# Patient Record
Sex: FEMALE | Race: WHITE | NOT HISPANIC OR LATINO | URBAN - METROPOLITAN AREA
[De-identification: names, ages, dates, MRNs, and addresses within clinical notes are randomized per-mention and may not be internally consistent; named-entity substitution may affect disease eponyms.]

---

## 2019-05-08 PROBLEM — Z00.00 ENCOUNTER FOR PREVENTIVE HEALTH EXAMINATION: Status: ACTIVE | Noted: 2019-05-08

## 2019-05-25 ENCOUNTER — EMERGENCY (EMERGENCY)
Facility: HOSPITAL | Age: 29
LOS: 1 days | Discharge: HOME | End: 2019-05-25
Attending: EMERGENCY MEDICINE | Admitting: EMERGENCY MEDICINE
Payer: MEDICAID

## 2019-05-25 VITALS
DIASTOLIC BLOOD PRESSURE: 78 MMHG | OXYGEN SATURATION: 98 % | TEMPERATURE: 99 F | HEART RATE: 96 BPM | RESPIRATION RATE: 18 BRPM | SYSTOLIC BLOOD PRESSURE: 128 MMHG

## 2019-05-25 VITALS
DIASTOLIC BLOOD PRESSURE: 77 MMHG | SYSTOLIC BLOOD PRESSURE: 116 MMHG | OXYGEN SATURATION: 99 % | HEART RATE: 88 BPM | RESPIRATION RATE: 16 BRPM | TEMPERATURE: 98 F

## 2019-05-25 VITALS
DIASTOLIC BLOOD PRESSURE: 86 MMHG | TEMPERATURE: 98 F | HEIGHT: 66 IN | HEART RATE: 114 BPM | WEIGHT: 113.1 LBS | RESPIRATION RATE: 17 BRPM | SYSTOLIC BLOOD PRESSURE: 124 MMHG | OXYGEN SATURATION: 100 %

## 2019-05-25 PROCEDURE — 99284 EMERGENCY DEPT VISIT MOD MDM: CPT | Mod: 25

## 2019-05-25 PROCEDURE — 72100 X-RAY EXAM L-S SPINE 2/3 VWS: CPT

## 2019-05-25 PROCEDURE — 96372 THER/PROPH/DIAG INJ SC/IM: CPT

## 2019-05-25 PROCEDURE — 72100 X-RAY EXAM L-S SPINE 2/3 VWS: CPT | Mod: 26

## 2019-05-25 PROCEDURE — 99283 EMERGENCY DEPT VISIT LOW MDM: CPT

## 2019-05-25 PROCEDURE — 73660 X-RAY EXAM OF TOE(S): CPT | Mod: 26,RT

## 2019-05-25 PROCEDURE — 99284 EMERGENCY DEPT VISIT MOD MDM: CPT

## 2019-05-25 PROCEDURE — 73660 X-RAY EXAM OF TOE(S): CPT

## 2019-05-25 RX ORDER — KETOROLAC TROMETHAMINE 30 MG/ML
30 SYRINGE (ML) INJECTION ONCE
Refills: 0 | Status: DISCONTINUED | OUTPATIENT
Start: 2019-05-25 | End: 2019-05-25

## 2019-05-25 RX ORDER — LIDOCAINE 4 G/100G
1 CREAM TOPICAL ONCE
Refills: 0 | Status: COMPLETED | OUTPATIENT
Start: 2019-05-25 | End: 2019-05-25

## 2019-05-25 RX ORDER — LIDOCAINE 4 G/100G
1 CREAM TOPICAL
Qty: 5 | Refills: 0
Start: 2019-05-25 | End: 2019-05-29

## 2019-05-25 RX ORDER — CARBAMIDE PEROXIDE 81.86 MG/ML
5 SOLUTION/ DROPS AURICULAR (OTIC)
Qty: 1 | Refills: 0
Start: 2019-05-25 | End: 2019-05-28

## 2019-05-25 RX ADMIN — LIDOCAINE 1 PATCH: 4 CREAM TOPICAL at 13:01

## 2019-05-25 RX ADMIN — Medication 30 MILLIGRAM(S): at 13:00

## 2019-05-25 RX ADMIN — Medication 30 MILLIGRAM(S): at 13:35

## 2019-05-25 NOTE — ED PROVIDER NOTE - ENMT, MLM
Airway patent, Nasal mucosa clear. Mouth with normal mucosa. Throat has no vesicles, no oropharyngeal exudates and uvula is midline.  +cerumen impaction noted in B ears, +mild bruise noted behind L ear, hearing intact B.
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
## 2019-05-25 NOTE — ED PROVIDER NOTE - SECONDARY DIAGNOSIS.
Back pain Ear pain, left Other closed physeal fracture of proximal phalanx of right great toe, initial encounter Cerumen impaction
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
## 2019-05-25 NOTE — ED PROVIDER NOTE - SKIN, MLM
+superficial abrasions and bruises noted to B anterior knees, bruising noted to L proximal palm and superficial abrasions to fingers B, no active bleeding or lacerations noted.

## 2019-05-25 NOTE — ED ADULT NURSE NOTE - NSIMPLEMENTINTERV_GEN_ALL_ED
Implemented All Fall Risk Interventions:  [place] to call system. Call bell, personal items and telephone within reach. Instruct patient to call for assistance. Room bathroom lighting operational. Non-slip footwear when patient is off stretcher. Physically safe environment: no spills, clutter or unnecessary equipment. Stretcher in lowest position, wheels locked, appropriate side rails in place. Provide visual cue, wrist band, yellow gown, etc. Monitor gait and stability. Monitor for mental status changes and reorient to person, place, and time. Review medications for side effects contributing to fall risk. Reinforce activity limits and safety measures with patient and family.
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
## 2019-05-25 NOTE — ED PROVIDER NOTE - PROGRESS NOTE DETAILS
Diaz Momin PGY1  Spoke to ENT, stated they did not have the right equipment to do a hearing test and remove wax in ED, recommended resident clinic on Monday. Diaz Momin PGY1  Used hydrogen peroxide and water, irrigated ear, mild improvement in sx.
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
## 2019-05-25 NOTE — ED PROVIDER NOTE - NSFOLLOWUPINSTRUCTIONS_ED_ALL_ED_FT
Activities as tolerated. Please encourage good oral and fluid intake. For pain, please take Motrin 400mg every 4 hours as needed, or Tylenol 650mg every 6 hours as needed.    Please see your primary care doctor within 24-48 hours for further management of your symptoms.  Please call [phone] to set up appointment with ENT clinic this week.    Please seek emergent medical management if you have any worsening signs or symptoms, such as vision changes, persistent hearing loss, chest pain, difficulty breathing, loss of consciousness, or persistent vomiting.

## 2019-05-25 NOTE — ED PROVIDER NOTE - MUSCULOSKELETAL, MLM
+mild ttp R paraspinal lumbar spine, no stepoffs/ecchymosis noted, no midline tenderness noted, No C/T spine tenderness or restriction of movement noted. +mild ttp R big toe with mild swelling, cap refill<2sec, ROM intact in toe, pulses and sensation intact, NVI, +bruising and superficial abrasions noted to B anterior knees, NT with FROM, NVI

## 2019-05-25 NOTE — ED PROVIDER NOTE - CHPI ED SYMPTOMS NEG
no chest pain/no blurred vision/no vomiting/no change in level of consciousness/no chest wall tenderness/no weakness
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
## 2019-05-25 NOTE — ED PROVIDER NOTE - CARE PLAN
Principal Discharge DX:	Hearing decreased, left Principal Discharge DX:	Hearing decreased, left  Secondary Diagnosis:	Cerumen impaction
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
## 2019-05-25 NOTE — ED ADULT NURSE NOTE - CHPI ED NUR SYMPTOMS NEG
no nausea/no dizziness/no fever/no decreased eating/drinking/no chills/no weakness/no tingling/no vomiting
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
## 2019-06-05 ENCOUNTER — APPOINTMENT (OUTPATIENT)
Dept: OTOLARYNGOLOGY | Facility: CLINIC | Age: 29
End: 2019-06-05

## 2019-07-12 PROBLEM — Z78.9 OTHER SPECIFIED HEALTH STATUS: Chronic | Status: ACTIVE | Noted: 2019-05-25

## 2019-07-26 ENCOUNTER — APPOINTMENT (OUTPATIENT)
Dept: ORTHOPEDIC SURGERY | Facility: CLINIC | Age: 29
End: 2019-07-26
Payer: MEDICAID

## 2019-07-26 VITALS
WEIGHT: 122 LBS | HEIGHT: 68 IN | HEART RATE: 79 BPM | DIASTOLIC BLOOD PRESSURE: 60 MMHG | BODY MASS INDEX: 18.49 KG/M2 | SYSTOLIC BLOOD PRESSURE: 91 MMHG

## 2019-07-26 DIAGNOSIS — S63.611A UNSPECIFIED SPRAIN OF LEFT INDEX FINGER, INITIAL ENCOUNTER: ICD-10-CM

## 2019-07-26 DIAGNOSIS — Z80.9 FAMILY HISTORY OF MALIGNANT NEOPLASM, UNSPECIFIED: ICD-10-CM

## 2019-07-26 DIAGNOSIS — Z78.9 OTHER SPECIFIED HEALTH STATUS: ICD-10-CM

## 2019-07-26 DIAGNOSIS — S62.624P DISPLACED FRACTURE OF MIDDLE PHALANX OF RIGHT RING FINGER, SUBSEQUENT ENCOUNTER FOR FRACTURE WITH MALUNION: ICD-10-CM

## 2019-07-26 DIAGNOSIS — Z82.61 FAMILY HISTORY OF ARTHRITIS: ICD-10-CM

## 2019-07-26 PROCEDURE — 99204 OFFICE O/P NEW MOD 45 MIN: CPT

## 2019-07-26 PROCEDURE — 73140 X-RAY EXAM OF FINGER(S): CPT | Mod: RT

## 2019-07-26 NOTE — DISCUSSION/SUMMARY
[FreeTextEntry1] : She has findings consistent with a right ring finger, middle phalanx, malunion, after a fracture 6 months ago.  She also has 2 1/2 weeks of left index finger pain at the PIP joint.  It is secondary to an occult sprain or localized inflammation.\par \par I had a discussion regarding today's visit, the diagnosis, and treatment options / recommendations.  With regard to the left index finger, I recommended use of Coban wrap, and range of motion exercises.  She will work to regain terminal flexion.  She will followup in one month if she is not improving.\par \par With regard to her right ring finger, I told her the only option would be an osteotomy and oriented.  I told her that this is a complicated procedure as the fracture has healed, and the results are unpredictable.  She would like to think about this.  I discussed the procedure with her and potential recovery.  If she decides to proceed, she will call the office.\par \par -  The nature and purposes of the operation/procedure was discussed in detail.  I discussed the surgical procedure in detail, as well as expected postoperative recovery and outcome.\par -  Possible risks, benefits, and complications (from known and unknown causes) of the procedure were discussed in detail.  \par -  Possible non-operative alternatives to the proposed treatment were discussed in detail.  \par -  She was told that possible risks/complications include, but are not limited to:  Infection, nerve or vessel injury, stiffness, tendon adhesions, hardware-related complications including, but not limited to, failure of the hardware, re-displacement of the fracture, the possible need for removal of hardware in the future, painful scar, poor outcome, need for additional surgical procedures, and other unforeseen complications.  \par -  In addition, the possibility of an "unsuccessful outcome," despite "successful surgery," was discussed with her.  She understands that even with successful osteotomy and ORIF of the fracture, she may have persistent pain, stiffness, and disability.  \par -  The patient fully understands these risks.  Again, she would like to think about this.  If she decides to proceed, she will only return to the office or call the office to schedule the procedure.  \par -  I had a lengthy discussion with the patient regarding today's visit, the diagnosis, and my surgical treatment recommendations.  The patient has agreed to this plan of management and has expressed full understanding.  All questions were fully answered to the patient's satisfaction.\par \par Over 50% of the time spent with the patient was on counseling the patient on the above diagnosis, treatment plan and prognosis.

## 2019-07-26 NOTE — HISTORY OF PRESENT ILLNESS
[Right] : right hand dominant [FreeTextEntry1] : She comes in today for evaluation of her right ring finger and her left index finger.  With regard to her right ring finger, she sustained a fracture 6 months ago.  She was seen by another physician and surgery was recommended.  However, because of insurance issues, surgery was not performed.  She has had pain, stiffness, and deformity since then.\par \par She also has questions regarding her left index finger, where she has noted pain for the past 2-1/2 weeks.  She also has stiffness.  She denies an injury.

## 2019-07-26 NOTE — PHYSICAL EXAM
[de-identified] : - Constitutional: This is a female in no obvious distress.  \par - Psych: Patient is alert and oriented to person, place and time.  Patient has a normal mood and affect.\par - Cardiovascular: Normal pulses throughout the upper extremities.  No significant varicosities are noted in the upper extremities. \par - Neuro: Strength and sensation are intact throughout the upper extremities.  Patient has normal coordination.\par - Respiratory:  Patient exhibits no evidence of shortness of breath or difficulty breathing.\par - Skin: No rashes, lesions, or other abnormalities are noted in the upper extremities.\par \par ---\par \par Examination of her right ring finger demonstrates swelling along the middle phalanx and DIP joint.  There is mild tenderness along the middle phalanx.  She has limitation of flexion and extension and has a swan neck deformity.  The finger is ulnar deviated.  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  She is neurovascularly intact distally.\par \par Examination of her left index finger demonstrates mild tenderness along the PIP joint.  There is no swelling.  There is no instability of the PIP joint collateral ligaments.  She has full extension with mild limitation of terminal flexion into the palm actively, with full passive flexion.  There is no evidence of a trigger finger.  She is neurovascularly intact distally. 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

## 2019-09-13 ENCOUNTER — EMERGENCY (EMERGENCY)
Facility: HOSPITAL | Age: 29
LOS: 1 days | Discharge: HOME | End: 2019-09-13
Attending: EMERGENCY MEDICINE | Admitting: EMERGENCY MEDICINE
Payer: MEDICAID

## 2019-09-13 VITALS
WEIGHT: 115.08 LBS | DIASTOLIC BLOOD PRESSURE: 63 MMHG | HEART RATE: 95 BPM | TEMPERATURE: 99 F | HEIGHT: 66 IN | RESPIRATION RATE: 16 BRPM | OXYGEN SATURATION: 100 % | SYSTOLIC BLOOD PRESSURE: 103 MMHG

## 2019-09-13 VITALS
HEART RATE: 74 BPM | TEMPERATURE: 99 F | SYSTOLIC BLOOD PRESSURE: 100 MMHG | OXYGEN SATURATION: 98 % | DIASTOLIC BLOOD PRESSURE: 63 MMHG

## 2019-09-13 PROCEDURE — 73110 X-RAY EXAM OF WRIST: CPT

## 2019-09-13 PROCEDURE — 73110 X-RAY EXAM OF WRIST: CPT | Mod: 26,RT

## 2019-09-13 PROCEDURE — 73130 X-RAY EXAM OF HAND: CPT | Mod: 26,RT

## 2019-09-13 PROCEDURE — 29125 APPL SHORT ARM SPLINT STATIC: CPT

## 2019-09-13 PROCEDURE — 99284 EMERGENCY DEPT VISIT MOD MDM: CPT | Mod: 25

## 2019-09-13 PROCEDURE — 99283 EMERGENCY DEPT VISIT LOW MDM: CPT

## 2019-09-13 PROCEDURE — 73130 X-RAY EXAM OF HAND: CPT

## 2019-09-13 PROCEDURE — 29125 APPL SHORT ARM SPLINT STATIC: CPT | Mod: RT

## 2019-09-13 RX ORDER — OXYCODONE AND ACETAMINOPHEN 5; 325 MG/1; MG/1
1 TABLET ORAL ONCE
Refills: 0 | Status: DISCONTINUED | OUTPATIENT
Start: 2019-09-13 | End: 2019-09-13

## 2019-09-13 RX ORDER — IBUPROFEN 200 MG
1 TABLET ORAL
Qty: 12 | Refills: 0
Start: 2019-09-13 | End: 2019-09-15

## 2019-09-13 RX ORDER — IBUPROFEN 200 MG
600 TABLET ORAL ONCE
Refills: 0 | Status: COMPLETED | OUTPATIENT
Start: 2019-09-13 | End: 2019-09-13

## 2019-09-13 RX ADMIN — Medication 600 MILLIGRAM(S): at 22:04

## 2019-09-13 RX ADMIN — Medication 600 MILLIGRAM(S): at 21:17

## 2019-09-13 RX ADMIN — OXYCODONE AND ACETAMINOPHEN 1 TABLET(S): 5; 325 TABLET ORAL at 21:18

## 2019-09-13 RX ADMIN — OXYCODONE AND ACETAMINOPHEN 1 TABLET(S): 5; 325 TABLET ORAL at 22:04
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
## 2019-09-13 NOTE — ED PROVIDER NOTE - PATIENT PORTAL LINK FT
You can access the FollowMyHealth Patient Portal offered by [facility] by registering at the following website: [email or web address]. By joining [facility]’s FollowMyHealth portal, you will also be able to view your health information using other applications (apps) compatible with our system.

## 2019-09-13 NOTE — ED ADULT NURSE NOTE - NSIMPLEMENTINTERV_GEN_ALL_ED
Implemented All Fall Risk Interventions:  [place] to call system. Call bell, personal items and telephone within reach. Instruct patient to call for assistance. Room bathroom lighting operational. Non-slip footwear when patient is off stretcher. Physically safe environment: no spills, clutter or unnecessary equipment. Stretcher in lowest position, wheels locked, appropriate side rails in place. Provide visual cue, wrist band, yellow gown, etc. Monitor gait and stability. Monitor for mental status changes and reorient to person, place, and time. Review medications for side effects contributing to fall risk. Reinforce activity limits and safety measures with patient and family.

## 2019-09-13 NOTE — ED PROVIDER NOTE - CARE PROVIDER_API CALL
Uriel Sims)  Orthopaedic Surgery Orthopedic Surgery  [address] [address]  Phone: [phone]  Fax: [phone]  Follow Up Time: 1-3 Days

## 2019-09-13 NOTE — ED PROVIDER NOTE - PROGRESS NOTE DETAILS
consulted ortho dr ferguson who advised splint and follow up in office as it has been a few days. will rx percocet and ibuprofen. All imaging reviewed. all results reviewed with pt including abnormal results. pt given a copy of results. pt advised to follow up with pmd regarding abnormal results. All questions answered and concerns addressed. pt verbalized understanding and agreement with plan and dx. pt advised on next step and when/where to follow up. pt advised on all take home and otc medications. pt advised to follow up with PMD. pt advised to return to ed for worsenng symptoms including fever, cp, sob. will dc.
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
## 2019-09-13 NOTE — ED PROVIDER NOTE - NSFOLLOWUPINSTRUCTIONS_ED_ALL_ED_FT
1. FOLLOW UP WITH YOUR PRIMARY DOCTOR IN 24-48 HOURS.   2. FOLLOW UP WITH ALL SPECIALIST DISCUSSED DURING YOUR VISIT.   3. TAKE ALL MEDICATIONS PRESCRIBED IN THE ER IF ANY ARE PRESCRIBED. CONTINUE YOUR HOME MEDICATIONS UNLESS OTHERWISE ADVISED DIFFERENTLY.   4. RETURN FOR WORSENING SYMPTOMS OR CONCERNS INCLUDING BUT NOT LIMITED TO FEVER, CHEST PAIN, OR TROUBLE BREATHING OR ANY OTHER CONCERNS  5. take ibuprofen as needed for pain as prescribed.   6. take percocet if needed for severe pain do not drive when taking  7. follow up with dr ferguson tomorrow
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
## 2019-09-13 NOTE — ED PROVIDER NOTE - PHYSICAL EXAMINATION
ms r ue:+ right wrist and hand with swelling. r wrist diffusely tender tp. 2nd mcp ttp unable to move fingers. + 2 radial. cap refill < 2 seconds rest of extremity nontender.

## 2019-10-02 ENCOUNTER — APPOINTMENT (OUTPATIENT)
Dept: ORTHOPEDIC SURGERY | Facility: CLINIC | Age: 29
End: 2019-10-02
Payer: MEDICAID

## 2019-10-02 VITALS
DIASTOLIC BLOOD PRESSURE: 71 MMHG | SYSTOLIC BLOOD PRESSURE: 105 MMHG | BODY MASS INDEX: 18.32 KG/M2 | HEIGHT: 66 IN | WEIGHT: 114 LBS

## 2019-10-02 PROCEDURE — 29075 APPL CST ELBW FNGR SHORT ARM: CPT | Mod: RT

## 2019-10-02 PROCEDURE — 99214 OFFICE O/P EST MOD 30 MIN: CPT | Mod: 25

## 2019-10-02 PROCEDURE — 73110 X-RAY EXAM OF WRIST: CPT | Mod: RT
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
## 2019-10-02 NOTE — ASSESSMENT
[FreeTextEntry1] : 28F with right distal radius fracture x 4 weeks. At this point we discussed her treatment options including surgery versus nonoperative treatment. she is hesitant to undergo surgery at this time. She understands that without surgery she will develop a malunion. She has opted for continued nonoperative treatment. A cast was applied. We will see her back in two weeks. Meanwhile she will work on digital ROM with therapy. At this point, should the malunion be symptomatic she may wish to undergo corrective osteotomy.

## 2019-10-17 ENCOUNTER — APPOINTMENT (OUTPATIENT)
Dept: ORTHOPEDIC SURGERY | Facility: CLINIC | Age: 29
End: 2019-10-17
Payer: MEDICAID

## 2019-10-17 DIAGNOSIS — S62.101A FRACTURE OF UNSPECIFIED CARPAL BONE, RIGHT WRIST, INITIAL ENCOUNTER FOR CLOSED FRACTURE: ICD-10-CM

## 2019-10-17 PROCEDURE — 73110 X-RAY EXAM OF WRIST: CPT | Mod: RT

## 2019-10-17 PROCEDURE — 99214 OFFICE O/P EST MOD 30 MIN: CPT

## 2019-10-19 PROBLEM — S62.101A CLOSED FRACTURE OF RIGHT WRIST, INITIAL ENCOUNTER: Status: ACTIVE | Noted: 2019-10-02

## 2019-10-19 NOTE — PHYSICAL EXAM
[de-identified] :  On exam the patient is well-appearing and in no apparent distress. \par \par Skin is intact. There is tenderness to palpation over the right distal radius with deformity. Remainder of the extremity is atraumatic. There is no tenderness over the volar tubercle of the scaphoid or anatomic snuffbox. Fist is 2cm to DPC\par \par Mobility is full about the shoulders and elbows. Digital flexion and extension is full. Thumb opposition is full to the base of the small fingers bilaterally. \par Sensation is intact to light touch in the ulnar, median, and radial nerve distributions. Motor is intact in the ulnar, median, and radial nerve distributions. Fingers are pink and well perfused. Capillary refill is brisk.\par \par  [de-identified] : Three views of the right wrist were obtained and reviewed. Evidence of radial styloid fracture with shortening, radial shift of the carpus and loss of volar inclination. Evidence of fracture callous.\par  [Normal] : no peripheral adenopathy appreciated

## 2019-10-19 NOTE — HISTORY OF PRESENT ILLNESS
[FreeTextEntry1] : 28F RHD with right distal radius fracture x 6 weeks. Date of injury 9/7/19. Method of injury fall. She reports that she went to [facility] and was told she broke her wrist. She subsequently followed up with [facility] who suggested surgery. Patient was very anxious and had difficulty making a decision on her treatment plan. She returns today for further followup.\par \par At last visit, I had once again recommended surgery. Patient was very stiff at that time, she was placed in a cast and range of motion was encouraged.\par \par She has been working on range of motion at home, she has not gone to therapy as suggested. Her pain is improving in her wrist.\par

## 2019-10-19 NOTE — ASSESSMENT
[FreeTextEntry1] : 28F with right distal radius fracture x 6 weeks.  Treatment options were discussed, including operative and nonoperative treatment. At this time, the patient has opted for operative treatment. Risks, benefits and alternatives were discussed. Risks include but are not limited to the risks associated with anesthesia and the risks associated with the surgery. These include stiffness, need for additional procedures, damage to surrounding structures, infection, nonunion and malunion. I believe the patient understands these risks. The proposed procedure is right distal radius osteotomy. She will be scheduled at the next available date. A wrist splint was placed today

## 2021-04-30 NOTE — ED ADULT NURSE NOTE - NURSING MUSC HAND GRASP
strong left, weak right [Fatigue] : fatigue [Recent Change In Weight] : ~T recent weight change [Muscle Weakness] : muscle weakness [Dizziness] : dizziness [Unsteady Walking] : ataxia [Negative] : Heme/Lymph [Fever] : no fever [Chills] : no chills [Dysuria] : no dysuria [Hematuria] : no hematuria [Joint Stiffness] : no joint stiffness [Joint Swelling] : no joint swelling [Fainting] : no fainting [FreeTextEntry2] : 7-lb weight loss since hospital discharge [FreeTextEntry7] : See HPI [FreeTextEntry8] : Making good urine [de-identified] : Dizziness with rapid positional change

## 2023-11-02 NOTE — ED ADULT TRIAGE NOTE - NS ED TRIAGE AVPU SCALE
Alert-The patient is alert, awake and responds to voice. The patient is oriented to time, place, and person. The triage nurse is able to obtain subjective information.
show

## 2025-01-31 NOTE — ED ADULT TRIAGE NOTE - BMI (KG/M2)
----- Message from Mariela East MD sent at 1/31/2025 11:07 AM CST -----  Serum pregnancy test negative.  HIV, hepatitis B and C, syphilis testing negative.    
Call to patient with results. Patient verbalized understanding.  
18.6

## 2025-07-16 NOTE — ED PROVIDER NOTE - NS ED MD DISPO DISCHARGE CCDA
"Chief Complaint   Patient presents with    Pre-Op Exam       Initial BP (!) 148/92   Pulse 80   Temp 97.8  F (36.6  C) (Temporal)   Resp 16   Ht 1.651 m (5' 5\")   Wt 101.6 kg (224 lb)   SpO2 95%   Breastfeeding No   BMI 37.28 kg/m   Estimated body mass index is 37.28 kg/m  as calculated from the following:    Height as of this encounter: 1.651 m (5' 5\").    Weight as of this encounter: 101.6 kg (224 lb).  Medication Review: complete    The next two questions are to help us understand your food security.  If you are feeling you need any assistance in this area, we have resources available to support you today.          7/7/2025   SDOH- Food Insecurity   Within the past 12 months, did you worry that your food would run out before you got money to buy more? N   Within the past 12 months, did the food you bought just not last and you didn t have money to get more? N         Health Care Directive:  Patient does not have a Health Care Directive: Discussed advance care planning with patient; however, patient declined at this time.    Jennifer Paula CMA      " Patient/Caregiver provided printed discharge information.